# Patient Record
Sex: MALE | Race: WHITE | NOT HISPANIC OR LATINO | Employment: FULL TIME | URBAN - NONMETROPOLITAN AREA
[De-identification: names, ages, dates, MRNs, and addresses within clinical notes are randomized per-mention and may not be internally consistent; named-entity substitution may affect disease eponyms.]

---

## 2018-09-13 PROBLEM — K92.1 HEMATOCHEZIA: Status: ACTIVE | Noted: 2018-09-13

## 2018-10-04 PROBLEM — K92.1 HEMATOCHEZIA: Status: RESOLVED | Noted: 2018-09-13 | Resolved: 2018-10-04

## 2018-10-04 PROBLEM — K92.1 HEMATOCHEZIA: Status: ACTIVE | Noted: 2018-10-04

## 2018-10-04 PROBLEM — K64.8 INTERNAL HEMORRHOIDS WITHOUT COMPLICATION: Status: ACTIVE | Noted: 2018-10-04

## 2021-02-03 PROBLEM — K59.1 FUNCTIONAL DIARRHEA: Status: ACTIVE | Noted: 2021-02-03

## 2021-02-03 PROBLEM — K92.1 HEMATOCHEZIA: Status: RESOLVED | Noted: 2018-10-04 | Resolved: 2021-02-03

## 2021-03-05 PROBLEM — D22.9 NEVUS: Status: ACTIVE | Noted: 2021-03-05

## 2021-03-05 PROBLEM — D18.01 CHERRY ANGIOMA: Status: ACTIVE | Noted: 2021-03-05

## 2021-03-05 PROBLEM — E78.49 OTHER HYPERLIPIDEMIA: Status: ACTIVE | Noted: 2021-03-05

## 2021-03-05 PROBLEM — L70.0 ACNE VULGARIS: Status: ACTIVE | Noted: 2021-03-05

## 2021-03-05 PROBLEM — F41.0 PANIC DISORDER: Status: ACTIVE | Noted: 2021-03-05

## 2021-05-11 PROBLEM — G47.39 OTHER SLEEP APNEA: Status: ACTIVE | Noted: 2021-05-11

## 2021-05-11 PROBLEM — R09.02 HYPOXEMIA: Status: ACTIVE | Noted: 2021-05-11

## 2021-10-06 PROBLEM — Z91.09 ENVIRONMENTAL ALLERGIES: Status: ACTIVE | Noted: 2021-10-06

## 2021-11-11 PROBLEM — J06.9 VIRAL URI WITH COUGH: Status: ACTIVE | Noted: 2021-11-11

## 2021-11-11 PROBLEM — J06.9 VIRAL URI WITH COUGH: Status: RESOLVED | Noted: 2021-11-11 | Resolved: 2021-11-11

## 2023-09-18 ENCOUNTER — OFFICE VISIT (OUTPATIENT)
Dept: URGENT CARE | Facility: CLINIC | Age: 38
End: 2023-09-18
Payer: COMMERCIAL

## 2023-09-18 VITALS
SYSTOLIC BLOOD PRESSURE: 118 MMHG | BODY MASS INDEX: 27.73 KG/M2 | DIASTOLIC BLOOD PRESSURE: 76 MMHG | OXYGEN SATURATION: 97 % | TEMPERATURE: 98.6 F | WEIGHT: 223 LBS | HEIGHT: 75 IN | HEART RATE: 80 BPM | RESPIRATION RATE: 18 BRPM

## 2023-09-18 DIAGNOSIS — F41.9 ANXIETY: ICD-10-CM

## 2023-09-18 PROCEDURE — 3074F SYST BP LT 130 MM HG: CPT | Performed by: PHYSICIAN ASSISTANT

## 2023-09-18 PROCEDURE — 3078F DIAST BP <80 MM HG: CPT | Performed by: PHYSICIAN ASSISTANT

## 2023-09-18 PROCEDURE — 99203 OFFICE O/P NEW LOW 30 MIN: CPT | Performed by: PHYSICIAN ASSISTANT

## 2023-09-18 ASSESSMENT — ENCOUNTER SYMPTOMS
COUGH: 0
MYALGIAS: 0
FEVER: 0
HEADACHES: 0
PALPITATIONS: 0
NAUSEA: 0
NERVOUS/ANXIOUS: 1
CHILLS: 0
VOMITING: 0
SHORTNESS OF BREATH: 0
INSOMNIA: 1
DIZZINESS: 0

## 2023-09-18 NOTE — LETTER
September 18, 2023         Patient: Owen Coleman   YOB: 1985   Date of Visit: 9/18/2023           To Whom it May Concern:    Owen Coleman was seen in my clinic on 9/18/2023.  Please excuse his absence from work today.      Sincerely,           Phyllis Deng P.A.-C.  Electronically Signed

## 2023-09-18 NOTE — PROGRESS NOTES
"Subjective     Owen Coleman is a 38 y.o. male who presents with Anxiety (Anxiety , cant sleep, x 7 days)    HPI:  Owen Coleman is a 38 y.o. male who presents today for evaluation of anxiety.  Patient has a longstanding history of anxiety.  Notes that he has been prescribed Ativan.  He normally does not need to take anything but he has been having a really difficult time sleeping this past week.  Notes that every time he tries to fall asleep his \"brain switches on\".  He has a prescription for 1 mg Ativan.  Says that if he does take something he normally only takes one half.  Last night he took a half a tablet and a few hours later took the second half of the tablet because it did not work.  Says he still could not fall asleep.  He had to miss work today.  His primary care provider is not in the office today.  Denies any new stressors.        Review of Systems   Constitutional:  Negative for chills and fever.   Respiratory:  Negative for cough and shortness of breath.    Cardiovascular:  Negative for chest pain and palpitations.   Gastrointestinal:  Negative for nausea and vomiting.   Musculoskeletal:  Negative for myalgias.   Neurological:  Negative for dizziness and headaches.   Psychiatric/Behavioral:  Negative for suicidal ideas. The patient is nervous/anxious and has insomnia.            PMH:  has a past medical history of Anxiety, Anxiety, Blood in stool (09/12/2018), Encounter for screening colonoscopy (09/21/2018), Environmental allergies (10/6/2021), Hematochezia (10/4/2018), and Other hyperlipidemia (3/5/2021).    He has no past medical history of Addisons disease (HCC), Adrenal disorder (HCC), Anemia, Arrhythmia, Arthritis, Asthma, Blood transfusion without reported diagnosis, Cataract, CHF (congestive heart failure) (HCC), Clotting disorder (HCC), COPD (chronic obstructive pulmonary disease) (HCC), Cushings syndrome (HCC), Depression, Diabetes (HCC), Diabetic neuropathy (HCC), GERD " "(gastroesophageal reflux disease), Glaucoma, Goiter, Head ache, Heart attack (HCC), Heart murmur, HIV (human immunodeficiency virus infection) (HCC), Hypertension, IBD (inflammatory bowel disease), Kidney disease, Meningitis, Migraine, Muscle disorder, Osteoporosis, Parathyroid disorder (HCC), Pituitary disease (HCC), Pulmonary emphysema (HCC), Seizure (HCC), Sickle cell disease (HCC), Stroke (HCC), Substance abuse (HCC), Thyroid disease, Tuberculosis, or Urinary tract infection.  MEDS:   Current Outpatient Medications:     Azelastine HCl 137 MCG/SPRAY Solution, ADMINISTER 1 SPRAY INTO AFFECTED NOSTRIL(S) 2 TIMES A DAY., Disp: 30 mL, Rfl: 11    fluticasone (FLONASE) 50 MCG/ACT nasal spray, Administer 1 Spray into affected nostril(S) every day., Disp: 48 mL, Rfl: 3  ALLERGIES: No Known Allergies  SURGHX:   Past Surgical History:   Procedure Laterality Date    COLONOSCOPY - ENDO  9/21/2018    Procedure: COLONOSCOPY - ENDO;  Surgeon: Bryan De La Rosa M.D.;  Location: SURGERY [facility];  Service: Gastroenterology    OTHER  2010    lymphnode biopsy    APPENDECTOMY      2008     SOCHX:  reports that he has been smoking cigarettes. He has a 15.0 pack-year smoking history. He has quit using smokeless tobacco. He reports current alcohol use of about 1.2 oz of alcohol per week. He reports that he does not use drugs.  FH: Family history was reviewed, no pertinent findings to report      Objective     /76   Pulse 80   Temp 37 °C (98.6 °F) (Temporal)   Resp 18   Ht 1.905 m (6' 3\")   Wt 101 kg (223 lb)   SpO2 97%   BMI 27.87 kg/m²      Physical Exam  Constitutional:       General: He is not in acute distress.     Appearance: He is not diaphoretic.   HENT:      Head: Normocephalic and atraumatic.      Right Ear: External ear normal.      Left Ear: External ear normal.   Eyes:      Conjunctiva/sclera: Conjunctivae normal.      Pupils: Pupils are equal, round, and reactive to light.   Pulmonary:      Effort: " Pulmonary effort is normal. No respiratory distress.   Musculoskeletal:      Cervical back: Normal range of motion.   Skin:     Findings: No rash.   Neurological:      Mental Status: He is alert and oriented to person, place, and time.   Psychiatric:         Mood and Affect: Mood and affect normal.         Cognition and Memory: Memory normal.         Judgment: Judgment normal.           Assessment & Plan     1. Anxiety  Patient presenting with anxiety and difficulty sleeping.  Discussed that he can try getting more exercise throughout the day.  He can try meditative techniques or taking a bath with Epsom salt.  May also use OTC melatonin.  He ultimately needs to get into see his primary care provider to have a longer discussion about potentially adjusting medications.  Note given for work today.  Patient expresses understanding and says he will attempt to get in with his primary care provider later this week.              Differential Diagnosis, natural history, and supportive care discussed. Return to the Urgent Care or follow up with your PCP if symptoms fail to resolve, or for any new or worsening symptoms. Emergency room precautions discussed. Patient and/or family appears understanding of information.

## 2023-09-19 PROBLEM — G47.00 INSOMNIA: Status: ACTIVE | Noted: 2023-09-19

## 2023-09-20 PROBLEM — G25.81 RESTLESS LEG: Status: ACTIVE | Noted: 2023-09-20

## 2024-11-05 PROBLEM — R06.83 SNORING: Status: ACTIVE | Noted: 2024-11-05

## 2024-11-05 PROBLEM — F41.1 GAD (GENERALIZED ANXIETY DISORDER): Status: ACTIVE | Noted: 2024-11-05

## 2024-11-18 ENCOUNTER — HOSPITAL ENCOUNTER (OUTPATIENT)
Facility: MEDICAL CENTER | Age: 39
End: 2024-11-18
Attending: PHYSICIAN ASSISTANT
Payer: COMMERCIAL

## 2024-11-18 ENCOUNTER — OFFICE VISIT (OUTPATIENT)
Dept: URGENT CARE | Facility: CLINIC | Age: 39
End: 2024-11-18
Payer: COMMERCIAL

## 2024-11-18 VITALS
RESPIRATION RATE: 16 BRPM | OXYGEN SATURATION: 99 % | DIASTOLIC BLOOD PRESSURE: 76 MMHG | HEART RATE: 76 BPM | SYSTOLIC BLOOD PRESSURE: 124 MMHG | TEMPERATURE: 97.8 F | HEIGHT: 75 IN | BODY MASS INDEX: 27.48 KG/M2 | WEIGHT: 221 LBS

## 2024-11-18 DIAGNOSIS — N48.1 BALANITIS: ICD-10-CM

## 2024-11-18 DIAGNOSIS — R30.0 DYSURIA: ICD-10-CM

## 2024-11-18 LAB
APPEARANCE UR: CLEAR
BILIRUB UR STRIP-MCNC: NEGATIVE MG/DL
COLOR UR AUTO: YELLOW
GLUCOSE UR STRIP.AUTO-MCNC: NEGATIVE MG/DL
KETONES UR STRIP.AUTO-MCNC: NEGATIVE MG/DL
LEUKOCYTE ESTERASE UR QL STRIP.AUTO: NEGATIVE
NITRITE UR QL STRIP.AUTO: NEGATIVE
PH UR STRIP.AUTO: 5.5 [PH] (ref 5–8)
PROT UR QL STRIP: NEGATIVE MG/DL
RBC UR QL AUTO: NEGATIVE
SP GR UR STRIP.AUTO: 1.02
UROBILINOGEN UR STRIP-MCNC: 0.2 MG/DL

## 2024-11-18 PROCEDURE — 87591 N.GONORRHOEAE DNA AMP PROB: CPT

## 2024-11-18 PROCEDURE — 87491 CHLMYD TRACH DNA AMP PROBE: CPT

## 2024-11-18 PROCEDURE — 3078F DIAST BP <80 MM HG: CPT | Performed by: PHYSICIAN ASSISTANT

## 2024-11-18 PROCEDURE — 3074F SYST BP LT 130 MM HG: CPT | Performed by: PHYSICIAN ASSISTANT

## 2024-11-18 PROCEDURE — 81002 URINALYSIS NONAUTO W/O SCOPE: CPT | Performed by: PHYSICIAN ASSISTANT

## 2024-11-18 PROCEDURE — 99213 OFFICE O/P EST LOW 20 MIN: CPT | Performed by: PHYSICIAN ASSISTANT

## 2024-11-18 RX ORDER — CLOTRIMAZOLE AND BETAMETHASONE DIPROPIONATE 10; .64 MG/G; MG/G
CREAM TOPICAL
Qty: 15 G | Refills: 0 | Status: SHIPPED | OUTPATIENT
Start: 2024-11-18

## 2024-11-18 NOTE — PROGRESS NOTES
lotirsonSubjective:     Verbal consent was acquired by the patient to use eÃ“tica ambient listening note generation during this visit     Owen Coleman is a 39 y.o. male who presents for Other (Redness, burning genital area located on tip, skin looks burnt  x 1 day )       History of Present Illness  The patient presents for evaluation of balanitis.    He reports experiencing a burning and stinging sensation, along with redness, on the underside of his penis. This was first noticed this morning during his shower. He describes the affected area as appearing different from the rest of his penis, which is circumcised. He also mentions a feeling of itchiness but reports no discharge from the urethra or the opening of his penis.     He has no concerns about sexually transmitted infections (STIs) and maintains a monogamous relationship with his wife. He has a history of jock itch in the groin area but is unsure if this is related to his current symptoms. He reports no pain during urination, urgency to urinate, or frequent urination.     He has been increasing his water intake and has not applied any treatments to the affected area. He has no history of genital herpes. He recalls a similar episode when he lived in [place] and worked in the [employer] industry, which he attributes to walking in the desert and subsequent rubbing. He was advised to use baby powder to keep the area dry.     He has been maintaining regular workouts and showers, and keeps himself clean. He used Old Spice body wash this morning, which he feels may have dried out his skin.          Medications:  fluticasone  LORazepam Tabs    Allergies:             Patient has no known allergies.    Past Social Hx:  Owen Coleman  reports that he has been smoking cigarettes. He has a 15 pack-year smoking history. He has quit using smokeless tobacco. He reports current alcohol use of about 1.2 oz of alcohol per week. He reports that he does not use  "drugs.           Problem list, medications, and allergies reviewed by myself today in Epic.     Objective:     /76   Pulse 76   Temp 36.6 °C (97.8 °F)   Resp 16   Ht 1.905 m (6' 3\")   Wt 100 kg (221 lb)   SpO2 99%   BMI 27.62 kg/m²     Physical Exam  Genitourinary:         Comments: Approximately 2 cm circular area of erythema on the ventral side of the glans and shaft without pustules.  No urethral discharge.          Lab Results   Component Value Date/Time    POCCOLOR yellow 11/18/2024 09:18 AM    POCAPPEAR clear 11/18/2024 09:18 AM    POCLEUKEST negative 11/18/2024 09:18 AM    POCNITRITE negative 11/18/2024 09:18 AM    POCUROBILIGE 0.2 11/18/2024 09:18 AM    POCPROTEIN negative 11/18/2024 09:18 AM    POCURPH 5.5 11/18/2024 09:18 AM    POCBLOOD negative 11/18/2024 09:18 AM    POCSPGRV 1.020 11/18/2024 09:18 AM    POCKETONES negative 11/18/2024 09:18 AM    POCBILIRUBIN negative 11/18/2024 09:18 AM    POCGLUCUA negative 11/18/2024 09:18 AM            Assessment/Plan:     Diagnosis and Associated Orders:     1. Dysuria  - POCT Urinalysis  - Chlamydia/GC, PCR (Urine); Future    2. Balanitis  - clotrimazole-betamethasone (LOTRISONE) 1-0.05 % Cream; Apply thin layer to affected area bid no more than two weeks  Dispense: 15 g; Refill: 0        Medical Decision Making:    Pleasant 39 y.o. presents to clinic with: Penile erythema and irritation    Assessment & Plan  1. Balanitis.  The patient's urine appears normal, ruling out urethritis. The symptoms suggest balanitis, an inflammation of the glans penis, possibly due to a fungal or yeast infection. The condition does not appear to be sexually transmitted. A combination antifungal and steroid cream will be prescribed, to be applied once or twice daily for a maximum of two weeks. He was advised to avoid using Old Spice body wash on the affected area and to instead wash it with warm water or hypoallergenic skin products. The prescription will be sent to [facility] " [facility].  Avoid using longer than 2 weeks, extremely thin layer.      I personally reviewed prior external notes and test results pertinent to today's visit. Patient is clinically stable at today's urgent care visit.  Patient appears nontoxic with no acute distress noted. Appropriate for outpatient care at this time.  Return to clinic or go to ED if symptoms worsen or persist.  Red flag symptoms discussed.  Patient/Parent/Guardian voices understanding.   All side effects of medication discussed including allergic response, GI upset, tendon injury, rash, sedation etc    Please note that this dictation was created using voice recognition software. I have made a reasonable attempt to correct obvious errors, but I expect that there are errors of grammar and possibly content that I did not discover before finalizing the note.    This note was electronically signed by Amie Gatica PA-C

## 2024-11-19 DIAGNOSIS — R30.0 DYSURIA: ICD-10-CM

## 2024-11-19 LAB
C TRACH DNA SPEC QL NAA+PROBE: NEGATIVE
N GONORRHOEA DNA SPEC QL NAA+PROBE: NEGATIVE
SPECIMEN SOURCE: NORMAL